# Patient Record
Sex: FEMALE | Race: WHITE | Employment: FULL TIME | URBAN - METROPOLITAN AREA
[De-identification: names, ages, dates, MRNs, and addresses within clinical notes are randomized per-mention and may not be internally consistent; named-entity substitution may affect disease eponyms.]

---

## 2019-10-29 ENCOUNTER — OFFICE VISIT (OUTPATIENT)
Dept: SURGERY | Age: 59
End: 2019-10-29

## 2019-10-29 VITALS
HEIGHT: 66 IN | WEIGHT: 155 LBS | DIASTOLIC BLOOD PRESSURE: 77 MMHG | SYSTOLIC BLOOD PRESSURE: 118 MMHG | HEART RATE: 72 BPM | BODY MASS INDEX: 24.91 KG/M2

## 2019-10-29 DIAGNOSIS — N64.4 MASTODYNIA OF LEFT BREAST: Primary | ICD-10-CM

## 2019-10-29 DIAGNOSIS — N64.4 MASTODYNIA OF RIGHT BREAST: ICD-10-CM

## 2019-10-29 RX ORDER — ESTRADIOL 0.04 MG/D
FILM, EXTENDED RELEASE TRANSDERMAL
COMMUNITY
Start: 2019-10-02

## 2019-10-29 RX ORDER — BUPROPION HYDROCHLORIDE 150 MG/1
TABLET ORAL DAILY
COMMUNITY
Start: 2019-09-16

## 2019-10-29 RX ORDER — PROGESTERONE 200 MG/1
CAPSULE ORAL DAILY
COMMUNITY
Start: 2019-10-12

## 2019-10-29 RX ORDER — LORAZEPAM 0.5 MG/1
0.5 TABLET ORAL
COMMUNITY
Start: 2019-10-05

## 2019-10-29 RX ORDER — GABAPENTIN 300 MG/1
CAPSULE ORAL
Refills: 0 | COMMUNITY
Start: 2019-10-18

## 2019-10-29 RX ORDER — DOXYCYCLINE 100 MG/1
CAPSULE ORAL
Refills: 0 | COMMUNITY
Start: 2019-08-13

## 2019-10-29 NOTE — PROGRESS NOTES
HISTORY OF PRESENT ILLNESS  Evelin Dejesus is a 61 y.o. female. HPI NEW patient consult referred by  Erick Driscoll NP for painful BILATERAL breast lumps for over a year. One painful mass on the LEFT side and another on the RIGHT which is less painful. Patient had a LEFT ultrasound guided breast biopsy 10/2019 at [facility] which was benign  Pain is worse after the biopsy. OB History   Obstetric Comments   Menarche 15, LMP age 54, # of children 2, age of 4st delivery 32, Hysterectomy/oophorectomy no/no, Breast bx yes, LEFT at [facility], benign, history of breast feeding yes, BCP yes, Hormone therapy yes       Family History:  No FH of breast or ovarian cancer. Imaging at [facility]:  Mammogram, 9/17/19, BILATERAL screening, BIRADS 4  LEFT 7mm complicated oval non-vascular mass, previously measured 5mm. Review of Systems   Constitutional: Negative. HENT: Positive for congestion. Eyes: Negative. Respiratory: Negative. Cardiovascular: Negative. Gastrointestinal: Negative. Genitourinary: Negative. Musculoskeletal: Positive for back pain and neck pain. Skin: Negative. Neurological: Negative. Endo/Heme/Allergies: Negative. Psychiatric/Behavioral: Positive for depression. The patient has insomnia. All other systems reviewed and are negative. Physical Exam   Pulmonary/Chest: Right breast exhibits nipple discharge and tenderness (UOQ). Right breast exhibits no inverted nipple, no mass and no skin change. Left breast exhibits nipple discharge (chronic minimal crustiness on both nipples), skin change (ecchymosis 6:00 1/3) and tenderness (lateral breast). Left breast exhibits no inverted nipple and no mass. Breasts are symmetrical.       Lymphadenopathy:     She has no axillary adenopathy. BREAST ULTRASOUND  Indication: Bilateral  breast pain  Technique:   The area was scanned using a high-frequency linear-array near-field transducer  Findings: pt has a 1 cm cyst 9:00 LEFT breast which does not correspond with the painful area. The other cysts in both breasts area associated with fibrocystic change, not large isolated cysts. No abnormal mass, lesion, or shadowing noted. No axillary lymphadenopathy  Impression: cysts do not correspond with painful areas in the breast.  Nodular breast tissue is normal.  Disposition: Normal worrisome finding on ultrasound  ASSESSMENT and PLAN    ICD-10-CM ICD-9-CM    1. Mastodynia of left breast N64.4 611.71    2. Mastodynia of right breast N64.4 611.71      1. Asymptomatic LEFT breast cyst.  No other large cysts identified  2. Breast pain is c/w costochondritis. May treat with heat or anti-inflammatories if needed  3.  Continue annual mammograms

## 2019-12-21 NOTE — LETTER
10/30/19 Patient: Gurpreet Cardoza YOB: 1960 Date of Visit: 10/29/2019 Rodrigo Shelton MD 
[address] [facility] [address] VIA Facsimile: [phone] Drake Berman NP 
[address] [facility] [address] VIA Facsimile: [phone] Dear MD Drake Patino NP, Thank you for referring Ms. Gurpreet Cardoza to [facility] for evaluation. My notes for this consultation are attached. If you have questions, please do not hesitate to call me. I look forward to following your patient along with you.  
 
 
Sincerely, 
 
Micheline Gray MD 
 

Health Care Proxy (HCP)

## 2022-05-03 ENCOUNTER — HOSPITAL ENCOUNTER (OUTPATIENT)
Dept: PHYSICAL THERAPY | Age: 62
Discharge: HOME | End: 2022-05-03
Payer: COMMERCIAL

## 2022-05-03 PROCEDURE — 97161 PT EVAL LOW COMPLEX 20 MIN: CPT | Performed by: PHYSICAL THERAPY ASSISTANT

## 2022-05-03 PROCEDURE — 97140 MANUAL THERAPY 1/> REGIONS: CPT | Performed by: PHYSICAL THERAPY ASSISTANT

## 2022-05-03 PROCEDURE — 97110 THERAPEUTIC EXERCISES: CPT | Performed by: PHYSICAL THERAPY ASSISTANT

## 2022-05-03 NOTE — PROGRESS NOTES
Physical Therapy at [facility],   a part of [address]  [facility], [address]  Phone: [phone]  Fax: [phone]    Plan of Care/Statement of Necessity for Physical Therapy Services  2-15    Patient name: Xiomara Sandoval  : 1960  Provider#: [id number]  Referral source: Maxim Hernandez DO      Medical/Treatment Diagnosis: Radiculopathy, cervical region [M54.12]  Other cervical disc degeneration, unspecified cervical region [M50.30]  Spondylosis without myelopathy or radiculopathy, cervical region [M47.812]  Cervicalgia [M54.2]     Prior Hospitalization: see medical history     Comorbidities: see chart  Prior Level of Function: see chart  Medications: Verified on Patient Summary List    Start of Care: 5/3/22      Onset Date: 2022       The [address] [place] [address] and following information is based on the information from the initial evaluation. Assessment/ key information: Pt has signs and symptoms of R cervical radiculopathy that affects her ability to perform ADL's, work, and function. Pt is a good candidate for therapy.     Evaluation Complexity History LOW Complexity : Zero comorbidities / personal factors that will impact the outcome / POC; Examination LOW Complexity : 1-2 Standardized tests and measures addressing body structure, function, activity limitation and / or participation in recreation  ;Presentation LOW Complexity : Stable, uncomplicated  ;Clinical Decision Making MEDIUM Complexity : FOTO score of 26-74  Overall Complexity Rating: LOW     Problem List: pain affecting function, decrease ROM, decrease strength, edema affecting function, impaired gait/ balance, decrease ADL/ functional abilitiies, decrease activity tolerance and decrease flexibility/ joint mobility   Treatment Plan may include any combination of the following: Therapeutic exercise, Therapeutic activities, Neuromuscular re-education, Physical agent/modality, Manual therapy and Patient education  Patient / Family readiness to learn indicated by: asking questions  Persons(s) to be included in education: patient (P)  Barriers to Learning/Limitations: None  Patient Goal (s): no pain in my neck  Patient Self Reported Health Status: good  Rehabilitation Potential: good    Short Term Goals: To be accomplished in 2 weeks:  Pt will be I w/ HEP  Pt will demonstrate proper posture for over 20 minutes  Pt will apply heat to neck at least 1 x day  Long Term Goals: To be accomplished in 8 weeks:  Pt will demonstrates full cervical ROM w/o pain  Pt will be able to perform work duties w/o increase in pain  Pt will be able to sleep 5/7 nights w/o pain in hand  Frequency / Duration: Patient to be seen 2 times per week for 8 weeks. Patient/ Caregiver education and instruction: self care, activity modification and exercises    [x]  Plan of care has been reviewed with MINDY Cole DPT, OCS 5/3/2022   ________________________________________________________________________    I certify that the above Therapy Services are being furnished while the patient is under my care. I agree with the treatment plan and certify that this therapy is necessary.     Physician's Signature:____________________  Date:____________Time: _________      Leonardo Mena DO

## 2022-05-03 NOTE — PROGRESS NOTES
PT INITIAL EVALUATION NOTE - [facility] 2-15    Patient Name: Kenzie Moya  [id number]  : 1960  [x]  Patient  Verified  Payor: Jennifer Michelle / Plan: Jacqueline Mobley [id number] PPO / Product Type: PPO /    In time: 12:30 pm  Out time: 1:30 pm  Total Treatment Time (min): 60  Total Timed Codes (min): 25  1:1 Treatment Time ( only): 25   Visit #: 1     Treatment Area: Radiculopathy, cervical region [M54.12]  Other cervical disc degeneration, unspecified cervical region [M50.30]  Spondylosis without myelopathy or radiculopathy, cervical region [M47.812]  Cervicalgia [M54.2]    SUBJECTIVE  Pain Level (0-10 scale): 6  Any medication changes, allergies to medications, adverse drug reactions, diagnosis change, or new procedure performed?: [] No    [x] Yes (see summary sheet for update)  Subjective:    Pt complains of R sided neck pain that radiates to 1-3 digits of her hand that started in 2022 w/ insidious onset. Pt states she lost her  in January and has had extreme stress from this event. Pt notices decreased ROM when looking to the R. Pt had MRI of neck in  when she had radiation of pain to L hand. 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 Pt plays golf 1 x week. PLOF: gardening, golf  Mechanism of Injury: unknown  Previous Treatment/Compliance: good  PMHx/Surgical Hx: see chart  Work Hx: desk job  Living Situation: alone  Pt Goals: \"no pain in my neck or hand\"  Barriers: none  Motivation: good  Substance use: none  Cognition: A & O x 4        OBJECTIVE/EXAMINATION  Posture:   Forward head and rounded shoulders  Other Observations:  n/a  Gait and Functional Mobility:  normal  Palpation: TTP over R upper trap and cervical paraspinals    Cervical ROM: flexion full, ext 20 deg w/ pain, R SB 8 deg w/ pain, L SB 10 deg  L shoulder ROM: full w/o pain    UPPER QUARTER   MUSCLE STRENGTH  KEY       R  L  0 - No Contraction  C1, C2 Neck Flex 5  5  1 - Trace   C3 Side Flex  5  5  2 - Poor   C4 Sh Elev  5  5  3 - Fair    C5 Deltoid/Biceps 4  5  4 - Good   C6 Wrist Ext  5  5  5 - Normal   C7 Triceps  5  5      C8 Thumb Ext  5  5      T1 Hand Inst  5  5          MMT: pain w/ shoulder abd  Neurological: Reflexes / Sensations: decreased sensation over 1-3 digits  Special Tests: + spurlings, + distraction  Pain increased w/ passive R SB, decreased w/ L SB       Modality rationale: decrease pain, increase tissue extensibility and increase muscle contraction/control to improve the patients ability to perform ADL's   Min Type Additional Details    [] Estim: []Att   []Unatt        []TENS instruct                  []IFC  []Premod   []NMES                     []Other:  []w/US   []w/ice   []w/heat  Position:  Location:    []  Traction: [] Cervical       []Lumbar                       [] Prone          []Supine                       []Intermittent   []Continuous Lbs:  [] before manual  [] after manual  []w/heat    []  Ultrasound: []Continuous   [] Pulsed at:                           []1MHz   []3MHz Location:  W/cm2:    [] Paraffin         Location:   []w/heat   10 []  Ice     [x]  Heat  []  Ice massage Position: supine  Location: neck    []  Laser  []  Other: Position:  Location:      []  Vasopneumatic Device Pressure:       [] lo [] med [] hi   Temperature:      [x] Skin assessment post-treatment:  [x]intact []redness- no adverse reaction    []redness - adverse reaction:     10 min Therapeutic Exercise:  [x] See flow sheet :   Rationale: increase ROM and increase strength to improve the patients ability to perform ADL's    15 min Manual Therapy: passive cervical distraction, L SB and rotation, STM to R upper trap and cervical paraspinals    Rationale: decrease pain, increase ROM and increase tissue extensibility to improve the patients ability to perform work duties          With   [x] TE   [] TA   [] Neuro   [] SC   [] other: Patient Education: [x] Review HEP    [] Progressed/Changed HEP based on:   [] positioning [] body mechanics   [] transfers   [] heat/ice application    [x] other: tissues at fault, role of PT, posture, work modifications, heat     Other Objective/Functional Measures: NT    Pain Level (0-10 scale) post treatment: 5    ASSESSMENT/Changes in Function:     [x]  See Plan of [address]wick, OCS 5/3/2022

## 2022-05-05 ENCOUNTER — HOSPITAL ENCOUNTER (OUTPATIENT)
Dept: PHYSICAL THERAPY | Age: 62
Discharge: HOME | End: 2022-05-05
Payer: COMMERCIAL

## 2022-05-05 PROCEDURE — 97110 THERAPEUTIC EXERCISES: CPT | Performed by: PHYSICAL THERAPY ASSISTANT

## 2022-05-05 PROCEDURE — 97140 MANUAL THERAPY 1/> REGIONS: CPT | Performed by: PHYSICAL THERAPY ASSISTANT

## 2022-05-06 ENCOUNTER — APPOINTMENT (OUTPATIENT)
Dept: PHYSICAL THERAPY | Age: 62
End: 2022-05-06
Payer: COMMERCIAL

## 2022-05-09 ENCOUNTER — HOSPITAL ENCOUNTER (OUTPATIENT)
Dept: PHYSICAL THERAPY | Age: 62
Discharge: HOME | End: 2022-05-09
Payer: COMMERCIAL

## 2022-05-09 PROCEDURE — 97140 MANUAL THERAPY 1/> REGIONS: CPT | Performed by: PHYSICAL THERAPY ASSISTANT

## 2022-05-09 PROCEDURE — 97110 THERAPEUTIC EXERCISES: CPT | Performed by: PHYSICAL THERAPY ASSISTANT

## 2022-05-09 NOTE — PROGRESS NOTES
PT DAILY TREATMENT NOTE - [facility] 2-15    Patient Name: Kevin Hall  Date:2022  : 1960  [x]  Patient  Verified  Payor: Armond Mcmanus / Plan: Jacqueline Mobley [id number] PPO / Product Type: PPO /    In time: 6:00 pm  Out time: 7:00 pm  Total Treatment Time (min): 60  Total Timed Codes (min): 50  1:1 Treatment Time (MC only): 40   Visit #:  3    Treatment Area: Radiculopathy, cervical region [M54.12]  Other cervical disc degeneration, unspecified cervical region [M50.30]  Spondylosis without myelopathy or radiculopathy, cervical region [M47.812]  Cervicalgia [M54.2]    SUBJECTIVE  Pain Level (0-10 scale): 0  Any medication changes, allergies to medications, adverse drug reactions, diagnosis change, or new procedure performed?: [x] No    [] Yes (see summary sheet for update)  Subjective functional status/changes:   [] No changes reported  Pt states her numbness was bad on Friday night but it has calmed since then. Pt really hasn't experienced numbness today.     OBJECTIVE    Modality rationale: decrease pain, increase tissue extensibility and increase muscle contraction/control to improve the patients ability to perform ADL's   Min Type Additional Details       [] Estim: []Att   []Unatt    []TENS instruct                  []IFC  []Premod   []NMES                     []Other:  []w/US   []w/ice   []w/heat  Position:  Location:       []  Traction: [] Cervical       []Lumbar                       [] Prone          []Supine                       []Intermittent   []Continuous Lbs:  [] before manual  [] after manual  []w/heat    []  Ultrasound: []Continuous   [] Pulsed                       at: []1MHz   []3MHz Location:  W/cm2:    [] Paraffin         Location:   []w/heat   10 []  Ice     [x]  Heat  []  Ice massage Position: supine  Location: cervical    []  Laser  []  Other: Position:  Location:      []  Vasopneumatic Device Pressure:       [] lo [] med [] hi   Temperature:      [x] Skin assessment post-treatment: [x]intact []redness- no adverse reaction    []redness - adverse reaction:     25 min Therapeutic Exercise:  [x] See flow sheet :   Rationale: increase ROM, increase strength and improve coordination to improve the patients ability to perform work duties    25 min Manual Therapy: passive cervical distraction, L SB, rotation, STM to R upper trap and cervical paraspinals    Rationale: decrease pain, increase ROM and increase tissue extensibility to improve the patients ability to perform work duties          With   [] TE   [] TA   [] Neuro   [] SC   [] other: Patient Education: [x] Review HEP    [] Progressed/Changed HEP based on:   [] positioning   [] body mechanics   [] transfers   [] heat/ice application    [] other:      Other Objective/Functional Measures: NT     Pain Level (0-10 scale) post treatment: 0    ASSESSMENT/Changes in Function:   Pt continues to progress well w/ R foraminal opening manual and there-ex. Pt will attempt mechanical traction next session. Patient will continue to benefit from skilled PT services to modify and progress therapeutic interventions, address functional mobility deficits, address ROM deficits, address strength deficits, analyze and address soft tissue restrictions and analyze and cue movement patterns to attain remaining goals.      []  See Plan of Care  []  See progress note/recertification  []  See Discharge Summary         Progress towards goals / Updated goals:  NT    PLAN  []  Upgrade activities as tolerated     [x]  Continue plan of care  []  Update interventions per flow sheet       []  Discharge due to:_  []  Other:_      Tawnya Vasques DPT, OCS 5/9/2022

## 2022-05-10 ENCOUNTER — APPOINTMENT (OUTPATIENT)
Dept: PHYSICAL THERAPY | Age: 62
End: 2022-05-10
Payer: COMMERCIAL

## 2022-05-20 ENCOUNTER — HOSPITAL ENCOUNTER (OUTPATIENT)
Dept: PHYSICAL THERAPY | Age: 62
Discharge: HOME | End: 2022-05-20
Payer: COMMERCIAL

## 2022-05-20 PROCEDURE — 97110 THERAPEUTIC EXERCISES: CPT | Performed by: PHYSICAL THERAPY ASSISTANT

## 2022-05-20 PROCEDURE — 97140 MANUAL THERAPY 1/> REGIONS: CPT | Performed by: PHYSICAL THERAPY ASSISTANT

## 2022-05-20 PROCEDURE — 97012 MECHANICAL TRACTION THERAPY: CPT | Performed by: PHYSICAL THERAPY ASSISTANT

## 2022-05-20 NOTE — PROGRESS NOTES
PT DAILY TREATMENT NOTE - [facility] 2-15    Patient Name: Joshua Kendrick  Date:2022  : 1960  [x]  Patient  Verified  Payor: Petty Face / Plan: Jacqueline Mobley [id number] PPO / Product Type: PPO /    In time: 11:00 am  Out time: 12:00 pm  Total Treatment Time (min): 60  Total Timed Codes (min): 50  1:1 Treatment Time ( only): 50   Visit #:  4    Treatment Area: Radiculopathy, cervical region [M54.12]  Other cervical disc degeneration, unspecified cervical region [M50.30]  Spondylosis without myelopathy or radiculopathy, cervical region [M47.812]  Cervicalgia [M54.2]    SUBJECTIVE  Pain Level (0-10 scale): 0  Any medication changes, allergies to medications, adverse drug reactions, diagnosis change, or new procedure performed?: [x] No    [] Yes (see summary sheet for update)  Subjective functional status/changes:   [] No changes reported  Pt states she has good days and bad days. Pt drove back from [place] on Wednesday and had  numbness last night. Pt has f/u appt w/ MD on  or .     OBJECTIVE    Modality rationale: decrease pain, increase tissue extensibility and increase muscle contraction/control to improve the patients ability to perform ADL's   Min Type Additional Details       [] Estim: []Att   []Unatt    []TENS instruct                  []IFC  []Premod   []NMES                     []Other:  []w/US   []w/ice   []w/heat  Position:  Location:      10 [x]  Traction: [x] Cervical       []Lumbar                       [] Prone          [x]Supine                       [x]Intermittent   []Continuous Lbs: 18  [] before manual  [] after manual  [x]w/heat    []  Ultrasound: []Continuous   [] Pulsed                       at: []1MHz   []3MHz Location:  W/cm2:    [] Paraffin         Location:   []w/heat    []  Ice     []  Heat  []  Ice massage Position: supine  Location: cervical    []  Laser  []  Other: Position:  Location:      []  Vasopneumatic Device Pressure:       [] lo [] med [] hi Temperature:      [x] Skin assessment post-treatment:  [x]intact []redness- no adverse reaction    []redness - adverse reaction:     25 min Therapeutic Exercise:  [x] See flow sheet :   Rationale: increase ROM, increase strength and improve coordination to improve the patients ability to perform work duties    20 min Manual Therapy: passive cervical distraction, L SB, rotation, STM to R upper trap and cervical paraspinals    Rationale: decrease pain, increase ROM and increase tissue extensibility to improve the patients ability to perform work duties          With   [] TE   [] TA   [] Neuro   [] SC   [] other: Patient Education: [x] Review HEP    [] Progressed/Changed HEP based on:   [] positioning   [] body mechanics   [] transfers   [] heat/ice application    [] other:      Other Objective/Functional Measures: NT     Pain Level (0-10 scale) post treatment: 0    ASSESSMENT/Changes in Function:   Pt tolerated there-ex well and felt relief w/ traction machine. Pt will be sent back to MD for possible MRI if her pain does not centralize by next week. Patient will continue to benefit from skilled PT services to modify and progress therapeutic interventions, address functional mobility deficits, address ROM deficits, address strength deficits, analyze and address soft tissue restrictions and analyze and cue movement patterns to attain remaining goals.      []  See Plan of Care  []  See progress note/recertification  []  See Discharge Summary         Progress towards goals / Updated goals:  NT    PLAN  []  Upgrade activities as tolerated     [x]  Continue plan of care  []  Update interventions per flow sheet       []  Discharge due to:_  []  Other:_      Santino Hinds DPT, OCS 5/20/2022

## 2022-05-24 ENCOUNTER — HOSPITAL ENCOUNTER (OUTPATIENT)
Dept: PHYSICAL THERAPY | Age: 62
Discharge: HOME | End: 2022-05-24
Payer: COMMERCIAL

## 2022-05-24 PROCEDURE — 97012 MECHANICAL TRACTION THERAPY: CPT | Performed by: PHYSICAL THERAPY ASSISTANT

## 2022-05-24 PROCEDURE — 97140 MANUAL THERAPY 1/> REGIONS: CPT | Performed by: PHYSICAL THERAPY ASSISTANT

## 2022-05-24 PROCEDURE — 97110 THERAPEUTIC EXERCISES: CPT | Performed by: PHYSICAL THERAPY ASSISTANT

## 2022-05-24 NOTE — PROGRESS NOTES
PT DAILY TREATMENT NOTE - [facility] 2-15    Patient Name: Kevin Hall  Date:2022  : 1960  [x]  Patient  Verified  Payor: Argelia Us  / Plan: Jacqueline Mobley [id number] PPO / Product Type: PPO /    In time: 3:00 pm  Out time: 3:55 pm  Total Treatment Time (min): 55  Total Timed Codes (min): 55  1:1 Treatment Time ( only): 54  Visit #:  5    Treatment Area: Radiculopathy, cervical region [M54.12]  Other cervical disc degeneration, unspecified cervical region [M50.30]  Spondylosis without myelopathy or radiculopathy, cervical region [M47.812]  Cervicalgia [M54.2]    SUBJECTIVE  Pain Level (0-10 scale): 0  Any medication changes, allergies to medications, adverse drug reactions, diagnosis change, or new procedure performed?: [x] No    [] Yes (see summary sheet for update)  Subjective functional status/changes:   [] No changes reported  Pt states she felt the best she has after last session.     OBJECTIVE    Modality rationale: decrease pain, increase tissue extensibility and increase muscle contraction/control to improve the patients ability to perform ADL's   Min Type Additional Details       [] Estim: []Att   []Unatt    []TENS instruct                  []IFC  []Premod   []NMES                     []Other:  []w/US   []w/ice   []w/heat  Position:  Location:      10 [x]  Traction: [x] Cervical       []Lumbar                       [] Prone          [x]Supine                       [x]Intermittent   []Continuous Lbs: 18  [] before manual  [] after manual  [x]w/heat    []  Ultrasound: []Continuous   [] Pulsed                       at: []1MHz   []3MHz Location:  W/cm2:    [] Paraffin         Location:   []w/heat    []  Ice     []  Heat  []  Ice massage Position: supine  Location: cervical    []  Laser  []  Other: Position:  Location:      []  Vasopneumatic Device Pressure:       [] lo [] med [] hi   Temperature:      [x] Skin assessment post-treatment:  [x]intact []redness- no adverse reaction    []redness - adverse reaction:     20 min Therapeutic Exercise:  [x] See flow sheet :   Rationale: increase ROM, increase strength and improve coordination to improve the patients ability to perform work duties    25 min Manual Therapy: passive cervical distraction, L SB, rotation, STM to R upper trap and cervical paraspinals    Rationale: decrease pain, increase ROM and increase tissue extensibility to improve the patients ability to perform work duties          With   [] TE   [] TA   [] Neuro   [] SC   [] other: Patient Education: [x] Review HEP    [] Progressed/Changed HEP based on:   [] positioning   [] body mechanics   [] transfers   [] heat/ice application    [] other:      Other Objective/Functional Measures: NT     Pain Level (0-10 scale) post treatment: 0    ASSESSMENT/Changes in Function:   Pt progressing better now that we have started the mechanical traction. Pt may benefit from home unit if this continues to help. Patient will continue to benefit from skilled PT services to modify and progress therapeutic interventions, address functional mobility deficits, address ROM deficits, address strength deficits, analyze and address soft tissue restrictions and analyze and cue movement patterns to attain remaining goals.      []  See Plan of Care  []  See progress note/recertification  []  See Discharge Summary         Progress towards goals / Updated goals:  NT    PLAN  []  Upgrade activities as tolerated     [x]  Continue plan of care  []  Update interventions per flow sheet       []  Discharge due to:_  []  Other:_      Glenys Lopez DPT, OCS 5/24/2022

## 2022-05-27 ENCOUNTER — HOSPITAL ENCOUNTER (OUTPATIENT)
Dept: PHYSICAL THERAPY | Age: 62
Discharge: HOME | End: 2022-05-27
Payer: COMMERCIAL

## 2022-05-27 PROCEDURE — 97012 MECHANICAL TRACTION THERAPY: CPT | Performed by: PHYSICAL THERAPY ASSISTANT

## 2022-05-27 PROCEDURE — 97110 THERAPEUTIC EXERCISES: CPT | Performed by: PHYSICAL THERAPY ASSISTANT

## 2022-05-27 PROCEDURE — 97140 MANUAL THERAPY 1/> REGIONS: CPT | Performed by: PHYSICAL THERAPY ASSISTANT

## 2022-06-01 ENCOUNTER — APPOINTMENT (OUTPATIENT)
Dept: PHYSICAL THERAPY | Age: 62
End: 2022-06-01

## 2022-06-03 ENCOUNTER — APPOINTMENT (OUTPATIENT)
Dept: PHYSICAL THERAPY | Age: 62
End: 2022-06-03

## 2023-03-23 ENCOUNTER — TRANSCRIBE ORDER (OUTPATIENT)
Dept: SCHEDULING | Age: 63
End: 2023-03-23

## 2023-03-23 DIAGNOSIS — Z13.6 ENCOUNTER FOR SCREENING FOR CARDIOVASCULAR DISORDERS: Primary | ICD-10-CM

## 2023-04-19 ENCOUNTER — HOSPITAL ENCOUNTER (OUTPATIENT)
Dept: CT IMAGING | Age: 63
Discharge: HOME | End: 2023-04-19
Attending: FAMILY MEDICINE
Payer: SELF-PAY

## 2023-04-19 DIAGNOSIS — Z13.6 ENCOUNTER FOR SCREENING FOR CARDIOVASCULAR DISORDERS: ICD-10-CM

## 2023-04-19 PROCEDURE — 75571 CT HRT W/O DYE W/CA TEST: CPT

## 2023-04-23 DIAGNOSIS — Z13.6 ENCOUNTER FOR SCREENING FOR CARDIOVASCULAR DISORDERS: Primary | ICD-10-CM
